# Patient Record
Sex: FEMALE | Race: WHITE | Employment: FULL TIME | URBAN - METROPOLITAN AREA
[De-identification: names, ages, dates, MRNs, and addresses within clinical notes are randomized per-mention and may not be internally consistent; named-entity substitution may affect disease eponyms.]

---

## 2021-07-26 PROBLEM — N18.6 ESRD (END STAGE RENAL DISEASE) (HCC): Status: ACTIVE | Noted: 2021-07-26

## 2021-07-26 PROBLEM — G25.81 RESTLESS LEG SYNDROME: Status: ACTIVE | Noted: 2021-07-26

## 2021-07-26 PROBLEM — I10 HTN (HYPERTENSION): Status: ACTIVE | Noted: 2021-07-26

## 2021-07-26 RX ORDER — SUCROFERRIC OXYHYDROXIDE 500 MG/1
1 TABLET, CHEWABLE ORAL DAILY
Status: ON HOLD | COMMUNITY
End: 2021-09-08

## 2021-07-26 RX ORDER — HYDRALAZINE HYDROCHLORIDE 25 MG/1
50 TABLET, FILM COATED ORAL 3 TIMES DAILY
COMMUNITY

## 2021-07-26 RX ORDER — SPIRONOLACTONE 25 MG/1
25 TABLET ORAL DAILY
COMMUNITY

## 2021-07-26 RX ORDER — VIT B COMP NO.3/FOLIC/C/BIOTIN 1 MG-60 MG
1 TABLET ORAL
COMMUNITY
End: 2021-08-20

## 2021-07-26 RX ORDER — FOLIC ACID/VIT B COMPLEX AND C 0.8 MG
1 TABLET ORAL DAILY
COMMUNITY

## 2021-07-26 RX ORDER — CEPHALEXIN 250 MG/1
250 CAPSULE ORAL 3 TIMES DAILY
Status: ON HOLD | COMMUNITY
End: 2021-09-08

## 2021-07-26 RX ORDER — ACETAMINOPHEN 325 MG/1
650 TABLET ORAL EVERY 4 HOURS PRN
COMMUNITY

## 2021-07-26 RX ORDER — TORSEMIDE 100 MG/1
100 TABLET ORAL DAILY
COMMUNITY

## 2021-07-26 RX ORDER — ROPINIROLE 0.25 MG/1
1 TABLET, FILM COATED ORAL NIGHTLY
COMMUNITY
Start: 2021-07-09

## 2021-07-26 RX ORDER — FERRIC CITRATE 210 MG/1
2 TABLET, COATED ORAL 3 TIMES DAILY
COMMUNITY

## 2021-07-26 RX ORDER — SODIUM BICARBONATE 650 MG/1
650 TABLET ORAL 2 TIMES DAILY
COMMUNITY

## 2021-08-13 ENCOUNTER — TELEPHONE (OUTPATIENT)
Dept: VASCULAR SURGERY | Age: 61
End: 2021-08-13

## 2021-08-13 NOTE — TELEPHONE ENCOUNTER
Called patients Dialysis in regards to more information on her access. ARELI Constantino states she does not know how old it is or who created it. She states she called pt to get the name of Doctor who created it. But was unable to get in contact with her. I also LVM for pt in regards to this. Explained to her that I am trying to get Op Notes and Office visit notes from previous Vascular Surgeon.

## 2021-08-19 NOTE — TELEPHONE ENCOUNTER
Spoke to patient in regards to this. She states that she use to go to Dr. Leonard Deal at [facility] in [address]. Called Dr. Kailey Montoya office to obtain more records in regards to patients access. Unable to get through LVM twice. Will try again in morning.

## 2021-08-19 NOTE — TELEPHONE ENCOUNTER
LVM for pt in regards to getting more information about old Vascular surgeon and what is going on with her current access. Spoke to [employer] at Dialysis. She states whenever they have issues cannulating patient does not let them continue to try cannulating. When they do get the needles in without any issues her pressures are all over the place. RN Valentino Line states she believes there may be some branches in the patients access that could be causing these issues.

## 2021-08-20 ENCOUNTER — INITIAL CONSULT (OUTPATIENT)
Dept: VASCULAR SURGERY | Age: 61
End: 2021-08-20
Payer: COMMERCIAL

## 2021-08-20 VITALS
BODY MASS INDEX: 30.29 KG/M2 | HEIGHT: 67 IN | OXYGEN SATURATION: 97 % | DIASTOLIC BLOOD PRESSURE: 67 MMHG | TEMPERATURE: 97.7 F | WEIGHT: 193 LBS | HEART RATE: 71 BPM | SYSTOLIC BLOOD PRESSURE: 119 MMHG | RESPIRATION RATE: 16 BRPM

## 2021-08-20 DIAGNOSIS — N18.6 ENCOUNTER REGARDING VASCULAR ACCESS FOR DIALYSIS FOR ESRD (HCC): Primary | ICD-10-CM

## 2021-08-20 DIAGNOSIS — T82.590A DIALYSIS AV FISTULA MALFUNCTION, INITIAL ENCOUNTER (HCC): ICD-10-CM

## 2021-08-20 DIAGNOSIS — Z99.2 ENCOUNTER REGARDING VASCULAR ACCESS FOR DIALYSIS FOR ESRD (HCC): Primary | ICD-10-CM

## 2021-08-20 PROCEDURE — 99204 OFFICE O/P NEW MOD 45 MIN: CPT | Performed by: SURGERY

## 2021-08-20 ASSESSMENT — ENCOUNTER SYMPTOMS
SHORTNESS OF BREATH: 0
ROS SKIN COMMENTS: LUE ECCHYMOSIS
SORE THROAT: 0
ABDOMINAL PAIN: 0

## 2021-08-20 NOTE — PROGRESS NOTES
Division of Vascular Surgery        New Consult      Physician Requesting Consult:  Dr Paiz December    Reason for Consult:   LUE dialysis access issue    Chief Complaint:      LUE dialysis access issue    History of Present Illness:      Delta Morfin is a 61 y.o. woman who presents with concerns for access issue. She reports she has renal failure from untreated hypertension. She has been dependent on dialysis for several years. She had a left radiocephalic fistula which did not mature. Now using a LUE brachiocephalic fistula. The patient recently moved to the area from [place] and has been using a new dialysis center. She reports the last few dialysis sessions her fistula has been infiltrating and causing a significant amount of discomfort. She states that she has had one fisutlagram with angioplasty about 4 months ago. She states that two areas needed to be ballooned. She otherwise denies complaints. No fever, chills, CP, SOB. She denies symptoms suggestive of ischemic steal.    Medical History:     Past Medical History:   Diagnosis Date    ESRD (end stage renal disease) ([facility]     HTN (hypertension)     Restless leg syndrome        Surgical History:     No past surgical history on file. Family History:     No family history on file.     Allergies:       Sulfa antibiotics    Medications:      Current Outpatient Medications   Medication Sig Dispense Refill    Magnesium Hydroxide (DULCOLAX PO) Take by mouth daily      rOPINIRole (REQUIP) 0.25 MG tablet Take 1 tablet by mouth daily      Ferric Citrate (AURYXIA) 1  MG(Fe) TABS Take 2 tablets by mouth 3 times daily Take 3 tabs with largest meal of the day      hydrALAZINE (APRESOLINE) 25 MG tablet Take 50 mg by mouth 3 times daily       cephALEXin (KEFLEX) 250 MG capsule Take 250 mg by mouth 3 times daily      B Complex-C-Folic Acid (NEPHRO-SHERIE) 0.8 MG TABS Take 1 tablet by mouth daily      sodium bicarbonate 650 MG tablet Take 650 mg by mouth 2 times daily      spironolactone (ALDACTONE) 25 MG tablet Take 25 mg by mouth daily      torsemide (DEMADEX) 100 MG tablet Take 100 mg by mouth daily      acetaminophen (TYLENOL) 325 MG tablet Take 650 mg by mouth every 4 hours as needed for Pain Take 3 tbs orally every 4 hours for pain PRN      Sucroferric Oxyhydroxide (VELPHORO) 500 MG CHEW Take 1 tablet by mouth daily Take 1 chewable tablet once daily with largest meal (Patient not taking: Reported on 8/20/2021)       No current facility-administered medications for this visit. Social History:     Tobacco:    reports that she quit smoking about 2 years ago. Her smoking use included cigarettes. She quit after 30.00 years of use. She has never used smokeless tobacco.  Alcohol:      has no history on file for alcohol use. Drug Use:  has no history on file for drug use. Review of Systems:     Review of Systems   Constitutional: Negative for fever. HENT: Negative for sore throat. Eyes: Negative for visual disturbance. Respiratory: Negative for shortness of breath. Cardiovascular: Negative for chest pain. Gastrointestinal: Negative for abdominal pain. Endocrine: Negative. Genitourinary: Negative. Musculoskeletal: Negative for joint swelling. LUE discomfort at site of fistula   Skin: Positive for color change. Negative for wound. LUE ecchymosis   Allergic/Immunologic: Negative. Neurological: Negative for dizziness and numbness. Hematological: Negative. Psychiatric/Behavioral: Negative for behavioral problems. Physical Exam:     Vitals:  /67 (Site: Right Upper Arm, Position: Sitting, Cuff Size: Large Adult)   Pulse 71   Temp 97.7 °F (36.5 °C) (Temporal)   Resp 16   Ht 5' 7\" (1.702 m) Comment: per patient  Wt 193 lb (87.5 kg)   SpO2 97%   BMI 30.23 kg/m²     Physical Exam  Constitutional:       Appearance: Normal appearance. She is well-developed and well-groomed.    HENT:      Head: Normocephalic and atraumatic. Eyes:      Extraocular Movements: Extraocular movements intact. Conjunctiva/sclera: Conjunctivae normal.      Pupils: Pupils are equal, round, and reactive to light. Neck:      Vascular: No carotid bruit. Cardiovascular:      Rate and Rhythm: Normal rate and regular rhythm. Pulses: Normal pulses. Radial pulses are 2+ on the right side and 2+ on the left side. Comments: Fistula with good thrill in the upper arm, pulsatile proximally  Pulmonary:      Effort: Pulmonary effort is normal. No respiratory distress. Abdominal:      Palpations: Abdomen is soft. Tenderness: There is no abdominal tenderness. Musculoskeletal:         General: Normal range of motion. Left upper arm: No swelling or tenderness. Left hand: No swelling or tenderness. Normal strength. Normal sensation. Normal capillary refill. Cervical back: Full passive range of motion without pain. Right lower leg: No edema. Left lower leg: No edema. Skin:     General: Skin is warm and dry. Capillary Refill: Capillary refill takes less than 2 seconds. Neurological:      General: No focal deficit present. Mental Status: She is alert and oriented to person, place, and time. GCS: GCS eye subscore is 4. GCS verbal subscore is 5. GCS motor subscore is 6. Sensory: Sensation is intact. Motor: Motor function is intact. Psychiatric:         Mood and Affect: Mood normal.         Speech: Speech normal.         Behavior: Behavior normal.         Thought Content:  Thought content normal.       Imaging/Labs:        Bedside ultrasound reveals patent AV fistula, segmental narrowing over venous access site    Assessment and Plan:        ESRD on hemodialysis, malfunctioning left upper extremity AV fistula  · We discussed performing fistulagram again versus open revision, suspect angioplasty will not work over this segment based on appearance on ultrasound in the office today  · Recommend open revision with possible graft, patch or primary repair based on length of stenosis  · Risks and benefits discussed, questions answered and she consented to proceed  · Recommended that she start aspirin 81 mg daily to help with patency and reduce cardiovascular ischemic events    Electronically signed by Janie Valdez MD on 8/28/2021 at [address] [place] [address],[address]  Office: [phone]  Cell: [phone]  Email: [email or web address]

## 2021-08-28 ASSESSMENT — ENCOUNTER SYMPTOMS
ALLERGIC/IMMUNOLOGIC NEGATIVE: 1
COLOR CHANGE: 1

## 2021-09-08 ENCOUNTER — ANESTHESIA (OUTPATIENT)
Dept: OPERATING ROOM | Age: 61
End: 2021-09-08
Payer: COMMERCIAL

## 2021-09-08 ENCOUNTER — HOSPITAL ENCOUNTER (OUTPATIENT)
Age: 61
Setting detail: OUTPATIENT SURGERY
Discharge: HOME | End: 2021-09-08
Attending: SURGERY | Admitting: SURGERY
Payer: COMMERCIAL

## 2021-09-08 ENCOUNTER — ANESTHESIA EVENT (OUTPATIENT)
Dept: OPERATING ROOM | Age: 61
End: 2021-09-08
Payer: COMMERCIAL

## 2021-09-08 VITALS
OXYGEN SATURATION: 98 % | WEIGHT: 190 LBS | HEIGHT: 67 IN | DIASTOLIC BLOOD PRESSURE: 55 MMHG | TEMPERATURE: 97.9 F | BODY MASS INDEX: 29.82 KG/M2 | SYSTOLIC BLOOD PRESSURE: 130 MMHG | RESPIRATION RATE: 19 BRPM | HEART RATE: 66 BPM

## 2021-09-08 VITALS — OXYGEN SATURATION: 100 % | DIASTOLIC BLOOD PRESSURE: 63 MMHG | SYSTOLIC BLOOD PRESSURE: 132 MMHG | TEMPERATURE: 96.8 F

## 2021-09-08 LAB
GFR NON-AFRICAN AMERICAN: 6 ML/MIN
GFR SERPL CREATININE-BSD FRML MDRD: 8 ML/MIN
GFR SERPL CREATININE-BSD FRML MDRD: ABNORMAL ML/MIN/{1.73_M2}
GLUCOSE BLD-MCNC: 85 MG/DL (ref 74–100)
POC BUN: 35 MG/DL (ref 8–26)
POC CHLORIDE: 96 MMOL/L (ref 98–107)
POC CREATININE: 6.77 MG/DL (ref 0.51–1.19)
POC HEMATOCRIT: 47 % (ref 36–46)
POC HEMOGLOBIN: 15.9 G/DL (ref 12–16)
POC POTASSIUM: 4 MMOL/L (ref 3.5–4.5)
POC SODIUM: 135 MMOL/L (ref 138–146)
SARS-COV-2, RAPID: NOT DETECTED
SPECIMEN DESCRIPTION: NORMAL

## 2021-09-08 PROCEDURE — 87635 SARS-COV-2 COVID-19 AMP PRB: CPT

## 2021-09-08 PROCEDURE — 85014 HEMATOCRIT: CPT

## 2021-09-08 PROCEDURE — 3600000004 HC SURGERY LEVEL 4 BASE: Performed by: SURGERY

## 2021-09-08 PROCEDURE — 6370000000 HC RX 637 (ALT 250 FOR IP): Performed by: SURGERY

## 2021-09-08 PROCEDURE — 3700000001 HC ADD 15 MINUTES (ANESTHESIA): Performed by: SURGERY

## 2021-09-08 PROCEDURE — 84295 ASSAY OF SERUM SODIUM: CPT

## 2021-09-08 PROCEDURE — 82947 ASSAY GLUCOSE BLOOD QUANT: CPT

## 2021-09-08 PROCEDURE — 2580000003 HC RX 258: Performed by: NURSE ANESTHETIST, CERTIFIED REGISTERED

## 2021-09-08 PROCEDURE — 6360000002 HC RX W HCPCS: Performed by: SURGERY

## 2021-09-08 PROCEDURE — 3700000000 HC ANESTHESIA ATTENDED CARE: Performed by: SURGERY

## 2021-09-08 PROCEDURE — 7100000011 HC PHASE II RECOVERY - ADDTL 15 MIN: Performed by: SURGERY

## 2021-09-08 PROCEDURE — 3600000014 HC SURGERY LEVEL 4 ADDTL 15MIN: Performed by: SURGERY

## 2021-09-08 PROCEDURE — 36832 AV FISTULA REVISION OPEN: CPT | Performed by: SURGERY

## 2021-09-08 PROCEDURE — 2500000003 HC RX 250 WO HCPCS: Performed by: SURGERY

## 2021-09-08 PROCEDURE — 84520 ASSAY OF UREA NITROGEN: CPT

## 2021-09-08 PROCEDURE — 2580000003 HC RX 258: Performed by: SURGERY

## 2021-09-08 PROCEDURE — 82435 ASSAY OF BLOOD CHLORIDE: CPT

## 2021-09-08 PROCEDURE — 2709999900 HC NON-CHARGEABLE SUPPLY: Performed by: SURGERY

## 2021-09-08 PROCEDURE — 84132 ASSAY OF SERUM POTASSIUM: CPT

## 2021-09-08 PROCEDURE — C1768 GRAFT, VASCULAR: HCPCS | Performed by: SURGERY

## 2021-09-08 PROCEDURE — 7100000000 HC PACU RECOVERY - FIRST 15 MIN

## 2021-09-08 PROCEDURE — 7100000010 HC PHASE II RECOVERY - FIRST 15 MIN: Performed by: SURGERY

## 2021-09-08 PROCEDURE — 93005 ELECTROCARDIOGRAM TRACING: CPT | Performed by: SURGERY

## 2021-09-08 PROCEDURE — 82565 ASSAY OF CREATININE: CPT

## 2021-09-08 PROCEDURE — 6360000002 HC RX W HCPCS: Performed by: NURSE ANESTHETIST, CERTIFIED REGISTERED

## 2021-09-08 PROCEDURE — 7100000001 HC PACU RECOVERY - ADDTL 15 MIN

## 2021-09-08 DEVICE — GRAFT VASC L15CM ID6MM BOV CAR ART CLLGN FOR FUNC HEMO DYLS: Type: IMPLANTABLE DEVICE | Site: ARM | Status: FUNCTIONAL

## 2021-09-08 RX ORDER — MAGNESIUM HYDROXIDE 1200 MG/15ML
LIQUID ORAL CONTINUOUS PRN
Status: COMPLETED | OUTPATIENT
Start: 2021-09-08 | End: 2021-09-08

## 2021-09-08 RX ORDER — SODIUM CHLORIDE 9 MG/ML
INJECTION, SOLUTION INTRAVENOUS CONTINUOUS PRN
Status: DISCONTINUED | OUTPATIENT
Start: 2021-09-08 | End: 2021-09-08 | Stop reason: SDUPTHER

## 2021-09-08 RX ORDER — FENTANYL CITRATE 50 UG/ML
25 INJECTION, SOLUTION INTRAMUSCULAR; INTRAVENOUS EVERY 5 MIN PRN
Status: DISCONTINUED | OUTPATIENT
Start: 2021-09-08 | End: 2021-09-08 | Stop reason: HOSPADM

## 2021-09-08 RX ORDER — PROPOFOL 10 MG/ML
INJECTION, EMULSION INTRAVENOUS CONTINUOUS PRN
Status: DISCONTINUED | OUTPATIENT
Start: 2021-09-08 | End: 2021-09-08 | Stop reason: SDUPTHER

## 2021-09-08 RX ORDER — FENTANYL CITRATE 50 UG/ML
INJECTION, SOLUTION INTRAMUSCULAR; INTRAVENOUS PRN
Status: DISCONTINUED | OUTPATIENT
Start: 2021-09-08 | End: 2021-09-08 | Stop reason: SDUPTHER

## 2021-09-08 RX ORDER — ONDANSETRON 2 MG/ML
INJECTION INTRAMUSCULAR; INTRAVENOUS PRN
Status: DISCONTINUED | OUTPATIENT
Start: 2021-09-08 | End: 2021-09-08 | Stop reason: SDUPTHER

## 2021-09-08 RX ORDER — LIDOCAINE HYDROCHLORIDE 10 MG/ML
INJECTION, SOLUTION INFILTRATION; PERINEURAL PRN
Status: DISCONTINUED | OUTPATIENT
Start: 2021-09-08 | End: 2021-09-08 | Stop reason: ALTCHOICE

## 2021-09-08 RX ORDER — FENTANYL CITRATE 50 UG/ML
50 INJECTION, SOLUTION INTRAMUSCULAR; INTRAVENOUS EVERY 5 MIN PRN
Status: DISCONTINUED | OUTPATIENT
Start: 2021-09-08 | End: 2021-09-08 | Stop reason: HOSPADM

## 2021-09-08 RX ORDER — SODIUM CHLORIDE 9 MG/ML
INJECTION, SOLUTION INTRAVENOUS CONTINUOUS
Status: DISCONTINUED | OUTPATIENT
Start: 2021-09-08 | End: 2021-09-08 | Stop reason: HOSPADM

## 2021-09-08 RX ORDER — CEFAZOLIN SODIUM 1 G/3ML
INJECTION, POWDER, FOR SOLUTION INTRAMUSCULAR; INTRAVENOUS PRN
Status: DISCONTINUED | OUTPATIENT
Start: 2021-09-08 | End: 2021-09-08 | Stop reason: SDUPTHER

## 2021-09-08 RX ORDER — PROPOFOL 10 MG/ML
INJECTION, EMULSION INTRAVENOUS
Status: COMPLETED
Start: 2021-09-08 | End: 2021-09-08

## 2021-09-08 RX ORDER — HEPARIN SODIUM 1000 [USP'U]/ML
INJECTION, SOLUTION INTRAVENOUS; SUBCUTANEOUS PRN
Status: DISCONTINUED | OUTPATIENT
Start: 2021-09-08 | End: 2021-09-08 | Stop reason: ALTCHOICE

## 2021-09-08 RX ADMIN — SODIUM CHLORIDE: 9 INJECTION, SOLUTION INTRAVENOUS at 11:06

## 2021-09-08 RX ADMIN — SODIUM CHLORIDE: 9 INJECTION, SOLUTION INTRAVENOUS at 11:34

## 2021-09-08 RX ADMIN — PROPOFOL 200 MCG/KG/MIN: 10 INJECTION, EMULSION INTRAVENOUS at 11:38

## 2021-09-08 RX ADMIN — FENTANYL CITRATE 50 MCG: 50 INJECTION, SOLUTION INTRAMUSCULAR; INTRAVENOUS at 11:38

## 2021-09-08 RX ADMIN — FENTANYL CITRATE 25 MCG: 50 INJECTION, SOLUTION INTRAMUSCULAR; INTRAVENOUS at 12:33

## 2021-09-08 RX ADMIN — FENTANYL CITRATE 25 MCG: 50 INJECTION, SOLUTION INTRAMUSCULAR; INTRAVENOUS at 12:12

## 2021-09-08 RX ADMIN — CEFAZOLIN 2000 MG: 1 INJECTION, POWDER, FOR SOLUTION INTRAMUSCULAR; INTRAVENOUS at 11:44

## 2021-09-08 RX ADMIN — ONDANSETRON 4 MG: 2 INJECTION, SOLUTION INTRAMUSCULAR; INTRAVENOUS at 11:49

## 2021-09-08 ASSESSMENT — PULMONARY FUNCTION TESTS
PIF_VALUE: 1
PIF_VALUE: 0
PIF_VALUE: 1

## 2021-09-08 ASSESSMENT — ENCOUNTER SYMPTOMS: SHORTNESS OF BREATH: 0

## 2021-09-08 NOTE — PROGRESS NOTES
Patient admitted, consent signed and questions answered. Patient ready for procedure. Call light to reach with side rails up 2 of 2. Family at bedside with patient. History and physical completed and site marked. Anesthesia in to see.   2% Chlorhexidine cloths used to prep site / left arm

## 2021-09-08 NOTE — ANESTHESIA PRE PROCEDURE
Department of Anesthesiology  Preprocedure Note       Name:  Major Nunez   Age:  61 y.o.  :  1960                                          MRN:  [id number]         Date:  2021      Surgeon: Thania Mak): Janie Valdez MD    Procedure: Procedure(s):  AV FISTULA REVISON UPPER EXTREMITY    Medications prior to admission:   Prior to Admission medications    Medication Sig Start Date End Date Taking? Authorizing Provider   Magnesium Hydroxide (DULCOLAX PO) Take by mouth daily    Historical Provider, MD   rOPINIRole (REQUIP) 0.25 MG tablet Take 1 tablet by mouth daily 21   Historical Provider, MD   Ferric Citrate (AURYXIA) 1  MG(Fe) TABS Take 2 tablets by mouth 3 times daily Take 3 tabs with largest meal of the day    Historical Provider, MD   hydrALAZINE (APRESOLINE) 25 MG tablet Take 50 mg by mouth 3 times daily     Historical Provider, MD   cephALEXin (KEFLEX) 250 MG capsule Take 250 mg by mouth 3 times daily    Historical Provider, MD   B Complex-C-Folic Acid (NEPHRO-SHERIE) 0.8 MG TABS Take 1 tablet by mouth daily    Historical Provider, MD   sodium bicarbonate 650 MG tablet Take 650 mg by mouth 2 times daily    Historical Provider, MD   spironolactone (ALDACTONE) 25 MG tablet Take 25 mg by mouth daily    Historical Provider, MD   torsemide (DEMADEX) 100 MG tablet Take 100 mg by mouth daily    Historical Provider, MD   acetaminophen (TYLENOL) 325 MG tablet Take 650 mg by mouth every 4 hours as needed for Pain Take 3 tbs orally every 4 hours for pain PRN    Historical Provider, MD   Sucroferric Oxyhydroxide (VELPHORO) 500 MG CHEW Take 1 tablet by mouth daily Take 1 chewable tablet once daily with largest meal  Patient not taking: Reported on 2021    Historical Provider, MD       Current medications:    No current facility-administered medications for this encounter. Allergies:     Allergies   Allergen Reactions    Sulfa Antibiotics        Problem List:    Patient Active Problem List   Diagnosis Code    ESRD (end stage renal disease) ([facility] N18.6    Restless leg syndrome G25.81    HTN (hypertension) I10       Past Medical History:        Diagnosis Date    [facility] ([facility] stage [facility] disease) ([facility]     HTN (hypertension)     Restless leg syndrome        Past Surgical History:  No past surgical history on file. Social History:    Social History     Tobacco Use    Smoking status: Former Smoker     Years: 30.00     Types: Cigarettes     Quit date: 2019     Years since quittin.6    Smokeless tobacco: Never Used   Substance Use Topics    Alcohol use: Not on file                                Counseling given: Not Answered      Vital Signs (Current): There were no vitals filed for this visit. BP Readings from Last 3 Encounters:   21 119/67       NPO Status:                                                                                 BMI:   Wt Readings from Last 3 Encounters:   21 193 lb (87.5 kg)     There is no height or weight on file to calculate BMI.    CBC: No results found for: WBC, RBC, HGB, HCT, MCV, RDW, PLT    CMP: No results found for: NA, K, CL, CO2, BUN, CREATININE, GFRAA, AGRATIO, LABGLOM, GLUCOSE, PROT, CALCIUM, BILITOT, ALKPHOS, AST, ALT    POC Tests: No results for input(s): POCGLU, POCNA, POCK, POCCL, POCBUN, POCHEMO, POCHCT in the last 72 hours.     Coags: No results found for: PROTIME, INR, APTT    HCG (If Applicable): No results found for: PREGTESTUR, PREGSERUM, HCG, HCGQUANT     ABGs: No results found for: PHART, PO2ART, PRG4SNI, JYS5PXC, BEART, J5NIXNLG     Type & Screen (If Applicable):  No results found for: LABABO, LABRH    Drug/Infectious Status (If Applicable):  No results found for: HIV, HEPCAB    COVID-19 Screening (If Applicable): No results found for: COVID19        Anesthesia Evaluation  Patient summary reviewed and Nursing notes reviewed no history of anesthetic complications:   Airway: Mallampati: I  TM distance: >3 FB   Neck ROM: full  Mouth opening: > = 3 FB Dental:    (+) lower dentures and upper dentures      Pulmonary:normal exam  breath sounds clear to auscultation      (-) COPD, asthma, shortness of breath, recent URI and sleep apnea                           Cardiovascular:  Exercise tolerance: good (>4 METS),   (+) hypertension:,     (-) past MI, CAD, CABG/stent,  angina,  CHF, orthopnea and  SIFUENTES      Rhythm: regular  Rate: normal                    Neuro/Psych:   Negative Neuro/Psych ROS     (-) seizures, TIA and CVA           GI/Hepatic/Renal:   (+) renal disease: dialysis and ESRD,      (-) GERD       Endo/Other: Negative Endo/Other ROS       (-) diabetes mellitus               Abdominal:             Vascular: negative vascular ROS. Other Findings:           Anesthesia Plan      MAC     ASA 4       Induction: intravenous. Anesthetic plan and risks discussed with patient.       Plan discussed with CRNA and surgical team.                  Caroline Anaya MD   9/8/2021

## 2021-09-08 NOTE — H&P
Division of Vascular Surgery        H&P Update    Patient's Office Note from 8/20/21 was reviewed. There are no changes today. Plan to proceed with revision of left upper extremity AV fistula. Electronically signed by Melisa Bailey MD on 9/8/21 at 10:08 AM EDT      [address] [facility],[address]: [phone]  C: [phone]  Email: [email or web address]    Chief Complaint:       LUE dialysis access issue     History of Present Illness:      German Leahy is a 61 y.o. woman who presents with concerns for access issue. She reports she has renal failure from untreated hypertension. She has been dependent on dialysis for several years. She had a left radiocephalic fistula which did not mature. Now using a LUE brachiocephalic fistula. The patient recently moved to the area from [place] and has been using a new dialysis center. She reports the last few dialysis sessions her fistula has been infiltrating and causing a significant amount of discomfort. She states that she has had one fisutlagram with angioplasty about 4 months ago. She states that two areas needed to be ballooned. She otherwise denies complaints. No fever, chills, CP, SOB.   She denies symptoms suggestive of ischemic steal.     Medical History:      Past Medical History        Past Medical History:   Diagnosis Date    ESRD (end stage renal disease) ([facility]      HTN (hypertension)      Restless leg syndrome              Surgical History:      Past Surgical History   No past surgical history on file.        Family History:      Family History   No family history on file.        Allergies:       Sulfa antibiotics     Medications:      Current Facility-Administered Medications          Current Outpatient Medications   Medication Sig Dispense Refill    Magnesium Hydroxide (DULCOLAX PO) Take by mouth daily        rOPINIRole (REQUIP) 0.25 MG tablet Take 1 tablet by mouth daily        Ferric Citrate (AURYXIA) 1 GM 210 MG(Fe) TABS Take 2 tablets by mouth 3 times daily Take 3 tabs with largest meal of the day        hydrALAZINE (APRESOLINE) 25 MG tablet Take 50 mg by mouth 3 times daily         cephALEXin (KEFLEX) 250 MG capsule Take 250 mg by mouth 3 times daily        B Complex-C-Folic Acid (NEPHRO-SHERIE) 0.8 MG TABS Take 1 tablet by mouth daily        sodium bicarbonate 650 MG tablet Take 650 mg by mouth 2 times daily        spironolactone (ALDACTONE) 25 MG tablet Take 25 mg by mouth daily        torsemide (DEMADEX) 100 MG tablet Take 100 mg by mouth daily        acetaminophen (TYLENOL) 325 MG tablet Take 650 mg by mouth every 4 hours as needed for Pain Take 3 tbs orally every 4 hours for pain PRN        Sucroferric Oxyhydroxide (VELPHORO) 500 MG CHEW Take 1 tablet by mouth daily Take 1 chewable tablet once daily with largest meal (Patient not taking: Reported on 8/20/2021)          No current facility-administered medications for this visit.         Social History:      Tobacco:    reports that she quit smoking about 2 years ago. Her smoking use included cigarettes. She quit after 30.00 years of use. She has never used smokeless tobacco.  Alcohol:      has no history on file for alcohol use. Drug Use:  has no history on file for drug use.     Review of Systems:      Review of Systems   Constitutional: Negative for fever. HENT: Negative for sore throat. Eyes: Negative for visual disturbance. Respiratory: Negative for shortness of breath. Cardiovascular: Negative for chest pain. Gastrointestinal: Negative for abdominal pain. Endocrine: Negative. Genitourinary: Negative. Musculoskeletal: Negative for joint swelling. LUE discomfort at site of fistula   Skin: Positive for color change. Negative for wound. LUE ecchymosis   Allergic/Immunologic: Negative. Neurological: Negative for dizziness and numbness. Hematological: Negative.     Psychiatric/Behavioral: Negative for behavioral problems. Physical Exam:      Vitals:  /67 (Site: Right Upper Arm, Position: Sitting, Cuff Size: Large Adult)   Pulse 71   Temp 97.7 °F (36.5 °C) (Temporal)   Resp 16   Ht 5' 7\" (1.702 m) Comment: per patient  Wt 193 lb (87.5 kg)   SpO2 97%   BMI 30.23 kg/m²      Physical Exam  Constitutional:       Appearance: Normal appearance. She is well-developed and well-groomed. HENT:      Head: Normocephalic and atraumatic. Eyes:      Extraocular Movements: Extraocular movements intact. Conjunctiva/sclera: Conjunctivae normal.      Pupils: Pupils are equal, round, and reactive to light. Neck:      Vascular: No carotid bruit. Cardiovascular:      Rate and Rhythm: Normal rate and regular rhythm. Pulses: Normal pulses. Radial pulses are 2+ on the right side and 2+ on the left side. Comments: Fistula with good thrill in the upper arm, pulsatile proximally  Pulmonary:      Effort: Pulmonary effort is normal. No respiratory distress. Abdominal:      Palpations: Abdomen is soft. Tenderness: There is no abdominal tenderness. Musculoskeletal:         General: Normal range of motion. Left upper arm: No swelling or tenderness. Left hand: No swelling or tenderness. Normal strength. Normal sensation. Normal capillary refill. Cervical back: Full passive range of motion without pain. Right lower leg: No edema. Left lower leg: No edema. Skin:     General: Skin is warm and dry. Capillary Refill: Capillary refill takes less than 2 seconds. Neurological:      General: No focal deficit present. Mental Status: She is alert and oriented to person, place, and time. GCS: GCS eye subscore is 4. GCS verbal subscore is 5. GCS motor subscore is 6. Sensory: Sensation is intact. Motor: Motor function is intact.    Psychiatric:         Mood and Affect: Mood normal.         Speech: Speech normal.         Behavior: Behavior normal. Thought Content: Thought content normal.         Imaging/Labs:         Bedside ultrasound reveals patent AV fistula, segmental narrowing over venous access site     Assessment and Plan:         ESRD on hemodialysis, malfunctioning left upper extremity AV fistula  ? We discussed performing fistulagram again versus open revision, suspect angioplasty will not work over this segment based on appearance on ultrasound in the office today  ? Recommend open revision with possible graft, patch or primary repair based on length of stenosis  ? Risks and benefits discussed, questions answered and she consented to proceed  ?  Recommended that she start aspirin 81 mg daily to help with patency and reduce cardiovascular ischemic events

## 2021-09-08 NOTE — OP NOTE
Operative Note      Patient: Aquilino Arroyo  YOB: 1960  MRN: [id number]    Date of Procedure: 9/8/2021    Pre-Op Diagnosis: END STAGE RENAL DISEASE, malfunctioning AV fistula    Post-Op Diagnosis: Same       Procedure: Open revision of left upper extremity AV fistula, patch angioplasty    Surgeon(s): Cl Pavon MD    Assistant: Dr. Homar Dennison: Monitor Anesthesia Care, local    Estimated Blood Loss (mL): 23 ml    Complications: None    Specimens: none    Implants:  Implant Name Type Inv. Item Serial No.  Lot No. LRB No. Used Action   GRAFT VASC L15CM [id number] BOV CAR ART CLLGN FOR FUNC HEMO DYLS  GRAFT VASC L15CM [id number] BOV CAR ART CLLGN FOR FUNC HEMO DYLS  [employer] [id number] Left 1 Implanted       Drains: none    Findings: Sclerotic segment of cephalic vein fistula. After patch angioplasty improved thrill over fistula. Palpable radial artery pulse on completion. Detailed Description of Procedure:     Mrs. Zacarias Titus was brought to the operating room for open revision of her left upper extremity AV fistula. After appropriate anesthesia delivered, left upper extremity prepped and draped in standard sterile fashion, timeout performed and antibiotics given during this period. I began by evaluating the cephalic vein fistula in the upper arm and marking out the stenotic segment with ultrasound. Local anesthesia delivered and a small longitudinal incision created over the fistula. Electrocautery used to get down to the fistula. Blunt and sharp dissection performed and the cephalic vein fistula dissected out proximally and distally. Clamps placed proximally and distally and venotomy created and extended with kurtz scissors. Artegraft opened and fashioned to make a patch. Using 6-0 proline suture the stenotic portion of the cephalic vein fistula was patch angioplasty. Prior to completion usual flushing maneuvers performed and the anastomosis was hemostatic. There was an excellent thrill now over the fistula and a palpable radial artery pulse. Wound bed irrigated and dried, it was hemostatic. Incision closed in layers of interrupted vicryl and running monocryl. Steristrip and sterile dressings applied, patient tolerated procedure well and was brought to the recovery room.     Electronically signed by Renetta Cueto MD on 9/8/2021 at 1:14 PM

## 2021-09-08 NOTE — PROGRESS NOTES
Ambulates / up to chair without distress. Ice pack to left upper forearm. Site tender to touch. Softly swollen. Bandaid clean and dry. Taking fluids well.

## 2021-09-08 NOTE — PROGRESS NOTES
Received post Left arm fistulogram procedure to [facility] room 8. Assessment obtained. Restrictions reviewed with patient. Post procedure pathway initiated. arm site soft , clean dry and intact. No hematoma noted. Family at side.

## 2021-09-09 ENCOUNTER — TELEPHONE (OUTPATIENT)
Dept: VASCULAR SURGERY | Age: 61
End: 2021-09-09

## 2021-09-09 LAB
EKG ATRIAL RATE: 72 BPM
EKG P AXIS: 58 DEGREES
EKG P-R INTERVAL: 146 MS
EKG Q-T INTERVAL: 406 MS
EKG QRS DURATION: 96 MS
EKG QTC CALCULATION (BAZETT): 444 MS
EKG R AXIS: 24 DEGREES
EKG T AXIS: 86 DEGREES
EKG VENTRICULAR RATE: 72 BPM

## 2021-09-09 NOTE — TELEPHONE ENCOUNTER
[place] [facility] [place] that it is OK to continue to use Miguelina's Fistula, just not where the incision is.   She voiced understanding

## 2021-09-09 NOTE — ANESTHESIA POSTPROCEDURE EVALUATION
Department of Anesthesiology  Postprocedure Note    Patient: Capri Hou  MRN: [id number]  YOB: 1960  Date of evaluation: 9/9/2021  Time:  9:55 AM     Procedure Summary     Date: 09/08/21 Room / Location: [address] [place] [address]    Anesthesia Start: 1134 Anesthesia Stop: 7191    Procedure: LEFT ARM AV FISTULA REVISION WITH [id number] ARTEGRAFT (Left ) Diagnosis: (END STAGE RENAL DISEASE)    Surgeons: Sherwin Halsted, MD Responsible Provider: Carole Aranda MD    Anesthesia Type: MAC ASA Status: 4          Anesthesia Type: MAC    Maureen Phase I: Maureen Score: 10    Maureen Phase II: Maureen Score: 10    Last vitals: Reviewed and per EMR flowsheets.        Anesthesia Post Evaluation    Patient location during evaluation: PACU  Patient participation: complete - patient participated  Level of consciousness: awake and alert  Pain score: 0  Airway patency: patent  Nausea & Vomiting: no nausea and no vomiting  Complications: no  Cardiovascular status: hemodynamically stable  Respiratory status: acceptable, spontaneous ventilation and room air  Hydration status: euvolemic

## 2021-09-09 NOTE — TELEPHONE ENCOUNTER
----- Message from Sarah Marroquin MD sent at 9/8/2021  1:19 PM EDT -----  1 month postop s/p AV fistula revision  No testing  Ok to use fistula except where the incision is

## 2021-09-09 NOTE — TELEPHONE ENCOUNTER
----- Message from Devin Rogers MD sent at 9/8/2021  1:19 PM EDT -----  1 month postop s/p AV fistula revision  No testing  Ok to use fistula except where the incision is

## 2021-10-13 ENCOUNTER — HOSPITAL ENCOUNTER (OUTPATIENT)
Age: 61
Setting detail: SPECIMEN
Discharge: HOME | End: 2021-10-13
Payer: MEDICARE

## 2021-10-13 ENCOUNTER — OFFICE VISIT (OUTPATIENT)
Dept: OBGYN CLINIC | Age: 61
End: 2021-10-13
Payer: COMMERCIAL

## 2021-10-13 VITALS
DIASTOLIC BLOOD PRESSURE: 64 MMHG | SYSTOLIC BLOOD PRESSURE: 113 MMHG | BODY MASS INDEX: 30.07 KG/M2 | WEIGHT: 191.6 LBS | HEART RATE: 74 BPM | HEIGHT: 67 IN

## 2021-10-13 DIAGNOSIS — Z01.419 WOMEN'S ANNUAL ROUTINE GYNECOLOGICAL EXAMINATION: Primary | ICD-10-CM

## 2021-10-13 DIAGNOSIS — Z12.31 ENCOUNTER FOR SCREENING MAMMOGRAM FOR MALIGNANT NEOPLASM OF BREAST: ICD-10-CM

## 2021-10-13 DIAGNOSIS — Z01.419 ENCOUNTER FOR GYNECOLOGICAL EXAMINATION: ICD-10-CM

## 2021-10-13 PROCEDURE — G0101 CA SCREEN;PELVIC/BREAST EXAM: HCPCS | Performed by: ADVANCED PRACTICE MIDWIFE

## 2021-10-13 PROCEDURE — G8484 FLU IMMUNIZE NO ADMIN: HCPCS | Performed by: ADVANCED PRACTICE MIDWIFE

## 2021-10-13 SDOH — ECONOMIC STABILITY: TRANSPORTATION INSECURITY
IN THE PAST 12 MONTHS, HAS LACK OF TRANSPORTATION KEPT YOU FROM MEETINGS, WORK, OR FROM GETTING THINGS NEEDED FOR DAILY LIVING?: NO

## 2021-10-13 SDOH — ECONOMIC STABILITY: TRANSPORTATION INSECURITY
IN THE PAST 12 MONTHS, HAS THE LACK OF TRANSPORTATION KEPT YOU FROM MEDICAL APPOINTMENTS OR FROM GETTING MEDICATIONS?: NO

## 2021-10-13 SDOH — ECONOMIC STABILITY: HOUSING INSECURITY
IN THE LAST 12 MONTHS, WAS THERE A TIME WHEN YOU DID NOT HAVE A STEADY PLACE TO SLEEP OR SLEPT IN A SHELTER (INCLUDING NOW)?: NO

## 2021-10-13 SDOH — ECONOMIC STABILITY: INCOME INSECURITY: IN THE LAST 12 MONTHS, WAS THERE A TIME WHEN YOU WERE NOT ABLE TO PAY THE MORTGAGE OR RENT ON TIME?: NO

## 2021-10-13 SDOH — ECONOMIC STABILITY: FOOD INSECURITY: WITHIN THE PAST 12 MONTHS, YOU WORRIED THAT YOUR FOOD WOULD RUN OUT BEFORE YOU GOT MONEY TO BUY MORE.: NEVER TRUE

## 2021-10-13 SDOH — ECONOMIC STABILITY: FOOD INSECURITY: WITHIN THE PAST 12 MONTHS, THE FOOD YOU BOUGHT JUST DIDN'T LAST AND YOU DIDN'T HAVE MONEY TO GET MORE.: NEVER TRUE

## 2021-10-13 ASSESSMENT — ENCOUNTER SYMPTOMS
GASTROINTESTINAL NEGATIVE: 1
ALLERGIC/IMMUNOLOGIC NEGATIVE: 1
EYES NEGATIVE: 1
RESPIRATORY NEGATIVE: 1

## 2021-10-13 ASSESSMENT — ANXIETY QUESTIONNAIRES
7. FEELING AFRAID AS IF SOMETHING AWFUL MIGHT HAPPEN: 0
GAD7 TOTAL SCORE: 0
5. BEING SO RESTLESS THAT IT IS HARD TO SIT STILL: 0
6. BECOMING EASILY ANNOYED OR IRRITABLE: 0
1. FEELING NERVOUS, ANXIOUS, OR ON EDGE: 0
2. NOT BEING ABLE TO STOP OR CONTROL WORRYING: 0
3. WORRYING TOO MUCH ABOUT DIFFERENT THINGS: 0
4. TROUBLE RELAXING: 0

## 2021-10-13 ASSESSMENT — SOCIAL DETERMINANTS OF HEALTH (SDOH)
WITHIN THE LAST YEAR, HAVE YOU BEEN AFRAID OF YOUR PARTNER OR EX-PARTNER?: NO
WITHIN THE LAST YEAR, HAVE YOU BEEN KICKED, HIT, SLAPPED, OR OTHERWISE PHYSICALLY HURT BY YOUR PARTNER OR EX-PARTNER?: NO
WITHIN THE LAST YEAR, HAVE YOU BEEN HUMILIATED OR EMOTIONALLY ABUSED IN OTHER WAYS BY YOUR PARTNER OR EX-PARTNER?: NO
WITHIN THE LAST YEAR, HAVE TO BEEN RAPED OR FORCED TO HAVE ANY KIND OF SEXUAL ACTIVITY BY YOUR PARTNER OR EX-PARTNER?: NO
HOW HARD IS IT FOR YOU TO PAY FOR THE VERY BASICS LIKE FOOD, HOUSING, MEDICAL CARE, AND HEATING?: NOT HARD AT ALL

## 2021-10-13 ASSESSMENT — PATIENT HEALTH QUESTIONNAIRE - PHQ9
1. LITTLE INTEREST OR PLEASURE IN DOING THINGS: NOT AT ALL
SUM OF ALL RESPONSES TO PHQ9 QUESTIONS 1 & 2: 0
DEPRESSION UNABLE TO ASSESS: YES
2. FEELING DOWN, DEPRESSED OR HOPELESS: NOT AT ALL

## 2021-10-13 NOTE — PROGRESS NOTES
Pt is here today to est. Care. Pt just moved here. Pt would like to inform that she is missing part of her left ovary due to a surgery.

## 2021-10-13 NOTE — PROGRESS NOTES
[facility] AND GYNECOLOGY   [facility] [address]   [address]  [address] [place] [id number]  Dept: [phone]    Patient Name: Lisa Jacob  Patient Age: 64 y.o. Date of Visit: 10/13/2021    Subjective  Chief Complaint   Patient presents with    Gynecologic Exam     last pap 2020/mammogram 2015 hx breast cancer     No LMP recorded. Patient is postmenopausal.    Chaperone for Intimate Exam   Chaperone was offered as part of the rooming process. Patient declined and agrees to continue with exam without a chaperone. HPI  Patient arrives for annual exam.  Patient has concerns today. Concerns include hasn't had pap or mamm in years. Patient reports is  sexually active with 1 male partner(s). Patient denies  pain with sex . Patient denies a history of sexually transmitted infection(s). Patient does not want screening for sexually transmitted infection(s). Reports is not on contraception post menopausal.   Is new to the area [facility]. Lives with [facility]. Has ESRF and is on dialysis awaiting kidney transplant. No flowsheet data found. Interpretation of Total Score Depression Severity: 1-4 = Minimal depression, 5-9 = Mild depression, 10-14 = Moderate depression, 15-19 = Moderately severe depression, 20-27 = Severe depression  CALEB 7 SCORE 10/13/2021   CALEB-7 Total Score 0     Interpretation of CALEB-7 score: 5-9 = mild anxiety, 10-14 = moderate anxiety, 15+ = severe anxiety. Recommend referral to behavioral health for scores 10 or greater. Intimate Partner Violence: Not At Risk    Fear of Current or Ex-Partner: No    Emotionally Abused: No    Physically Abused: No    Sexually Abused: No       Review of Systems   Constitutional: Negative. HENT: Negative. Eyes: Negative. Respiratory: Negative. Cardiovascular: Negative. Gastrointestinal: Negative. Endocrine: Negative. Genitourinary: Negative. Musculoskeletal: Negative.     Skin: Negative. Allergic/Immunologic: Negative. Neurological: Negative. Hematological: Negative. Psychiatric/Behavioral: Negative. Preventive Health Screening:   Date of last pap: 2017? normal            HPV typing/date: unk    History of Abnormal Paps: no  Date of last mammogram: years ago   Date of last DEXA scan: na        Family history of Breast, Ovarian, Colon or Uterine Cancer:  no     If Yes see scanned worksheet    Objective  /64   Pulse 74   Ht 5' 7\" (1.702 m)   Wt 191 lb 9.6 oz (86.9 kg)   BMI 30.01 kg/m²     Physical Exam  Constitutional:       Appearance: She is well-developed. HENT:      Head: Normocephalic and atraumatic. Eyes:      Pupils: Pupils are equal, round, and reactive to light. Neck:      Thyroid: No thyromegaly. Trachea: No tracheal deviation. Cardiovascular:      Rate and Rhythm: Normal rate and regular rhythm. Heart sounds: Normal heart sounds. Pulmonary:      Effort: Pulmonary effort is normal.      Breath sounds: Normal breath sounds. Chest:      Breasts: Breasts are symmetrical.         Right: No mass, nipple discharge, skin change or tenderness. Left: No mass, nipple discharge, skin change or tenderness. Abdominal:      Palpations: Abdomen is soft. Tenderness: There is no abdominal tenderness. Genitourinary:     Exam position: Supine. Labia:         Right: No rash, tenderness, lesion or injury. Left: No rash, tenderness, lesion or injury. Vagina: Normal. No vaginal discharge, erythema, tenderness or bleeding. Cervix: No cervical motion tenderness or discharge. Uterus: Not tender. Adnexa:         Right: No mass or tenderness. Left: No mass or tenderness. Rectum: Normal.      Comments: Has multiple follicular bumps on labia. Not open not draining. She says they have been thre on and off for a long time. Musculoskeletal:         General: Normal range of motion.       Cervical

## 2021-10-18 ENCOUNTER — OFFICE VISIT (OUTPATIENT)
Dept: VASCULAR SURGERY | Age: 61
End: 2021-10-18

## 2021-10-18 VITALS
BODY MASS INDEX: 30.45 KG/M2 | SYSTOLIC BLOOD PRESSURE: 149 MMHG | RESPIRATION RATE: 17 BRPM | HEART RATE: 72 BPM | WEIGHT: 194 LBS | DIASTOLIC BLOOD PRESSURE: 84 MMHG | TEMPERATURE: 98 F | OXYGEN SATURATION: 98 % | HEIGHT: 67 IN

## 2021-10-18 DIAGNOSIS — Z98.890 S/P ARTERIOVENOUS (AV) FISTULA REPAIR: ICD-10-CM

## 2021-10-18 DIAGNOSIS — N18.6 ENCOUNTER REGARDING VASCULAR ACCESS FOR DIALYSIS FOR ESRD (HCC): Primary | ICD-10-CM

## 2021-10-18 DIAGNOSIS — Z86.79 S/P ARTERIOVENOUS (AV) FISTULA REPAIR: ICD-10-CM

## 2021-10-18 DIAGNOSIS — Z99.2 ENCOUNTER REGARDING VASCULAR ACCESS FOR DIALYSIS FOR ESRD (HCC): Primary | ICD-10-CM

## 2021-10-18 PROCEDURE — 99024 POSTOP FOLLOW-UP VISIT: CPT | Performed by: SURGERY

## 2021-10-18 ASSESSMENT — ENCOUNTER SYMPTOMS
SHORTNESS OF BREATH: 0
COLOR CHANGE: 0
CHEST TIGHTNESS: 0

## 2021-10-18 NOTE — PROGRESS NOTES
Division of Vascular Surgery        Postoperative Follow Up    AV fistula revision with patch angioplasty (9/8/21)    History of Present Illness:      Souleymane Acevedo is a 64 y.o. woman presents for postoperative follow up after undergoing open revision of her AV fistula. She has some discomfort over her incision site, but it has been getting better. She denies any prolong bleeding or elevated venous pressures during her dialysis treatments. There has not been any issues with cannulating her access since the surgery. Review of Systems:     Review of Systems   Constitutional: Negative for chills and fever. HENT: Negative for congestion. Eyes: Negative for visual disturbance. Respiratory: Negative for chest tightness and shortness of breath. Cardiovascular: Negative for chest pain and leg swelling. Skin: Negative for color change and wound. Neurological: Negative for weakness and numbness. Physical Exam:     Vitals:  BP (!) 149/84 (Site: Right Lower Arm, Position: Sitting, Cuff Size: Medium Adult)   Pulse 72   Temp 98 °F (36.7 °C) (Temporal)   Resp 17   Ht 5' 7\" (1.702 m) Comment: per pt  Wt 194 lb (88 kg)   SpO2 98%   BMI 30.38 kg/m²     Physical Exam  Cardiovascular:      Rate and Rhythm: Normal rate and regular rhythm. Pulses:           Radial pulses are 2+ on the left side. Pulmonary:      Effort: Pulmonary effort is normal. No respiratory distress. Musculoskeletal:      Left upper arm: No swelling or tenderness. Left hand: No swelling or tenderness. Normal strength. Normal sensation. Normal capillary refill. Right lower leg: No edema. Left lower leg: No edema. Skin:     General: Skin is warm. Capillary Refill: Capillary refill takes less than 2 seconds. Comments: Incision well healed   Neurological:      Mental Status: She is alert. GCS: GCS eye subscore is 4. GCS verbal subscore is 5. GCS motor subscore is 6.       Sensory: Sensation is intact. Motor: Motor function is intact. Imaging/Labs:     Ultrasound performed in the office reveals widely patent cephalic vein fistula and patch angioplasty    Assessment and Plan:     Open revision of AV fistula  · Continue to use AV fistula, avoid scar area for another few months to allow full healing of patch before cannulating  · She will follow up as needed regarding her access    Electronically signed by Jodi Dillard MD on 10/18/21 at 11:30 AM EDT      [address] [place] [address],[address]: [phone]  C: [phone]  Email: [email or web address]

## 2021-10-21 LAB — CYTOLOGY REPORT: NORMAL

## 2021-10-26 ENCOUNTER — HOSPITAL ENCOUNTER (OUTPATIENT)
Dept: MAMMOGRAPHY | Age: 61
Discharge: HOME | End: 2021-10-28
Payer: COMMERCIAL

## 2021-10-26 DIAGNOSIS — Z12.31 ENCOUNTER FOR SCREENING MAMMOGRAM FOR MALIGNANT NEOPLASM OF BREAST: ICD-10-CM

## 2021-10-26 PROCEDURE — 77067 SCR MAMMO BI INCL CAD: CPT

## (undated) DEVICE — SUTURE NONABSORBABLE MONOFILAMENT 7-0 BV-1 1X24 IN PROLENE 8702H

## (undated) DEVICE — STRIP,CLOSURE,WOUND,MEDI-STRIP,1/2X4: Brand: MEDLINE

## (undated) DEVICE — APPLICATOR MEDICATED 26 CC SOLUTION HI LT ORNG CHLORAPREP

## (undated) DEVICE — SUTURE VCRL + SZ 4-0 L27IN ABSRB UD L26MM SH 1/2 CIR VCP415H

## (undated) DEVICE — SUTURE PERMAHAND SZ 4-0 L18IN NONABSORBABLE BLK SILK BRAID A183H

## (undated) DEVICE — PROVE COVER: Brand: UNBRANDED

## (undated) DEVICE — GLOVE SURG SZ 6 CRM LTX FREE POLYISOPRENE POLYMER BEAD ANTI

## (undated) DEVICE — CONTAINER SPEC 33OZ URIN COLL BIODEGRADABLE PAPER DISP

## (undated) DEVICE — 3M™ STERI-STRIP™ COMPOUND BENZOIN TINCTURE 40 BAGS/CARTON 4 CARTONS/CASE C1544: Brand: 3M™ STERI-STRIP™

## (undated) DEVICE — GLOVE SURG SZ 8 CRM LTX FREE POLYISOPRENE POLYMER BEAD ANTI

## (undated) DEVICE — GLOVE SURG SZ 7 CRM LTX FREE POLYISOPRENE POLYMER BEAD ANTI

## (undated) DEVICE — DRESSING TRNSPAR W2XL2.75IN FLM SHT SEMIPERMEABLE WIND

## (undated) DEVICE — GLOVE SURG SZ 75 L12IN FNGR THK79MIL GRN LTX FREE

## (undated) DEVICE — SUTURE MCRYL SZ 5-0 L18IN ABSRB UD PC-3 L16MM 3/8 CIR Y844G

## (undated) DEVICE — INTENDED FOR TISSUE SEPARATION, AND OTHER PROCEDURES THAT REQUIRE A SHARP SURGICAL BLADE TO PUNCTURE OR CUT.: Brand: BARD-PARKER ® CARBON RIB-BACK BLADES

## (undated) DEVICE — BLADE ES ELASTOMERIC COAT INSUL DURABLE BEND UPTO 90DEG

## (undated) DEVICE — IV START KIT: Brand: MEDLINE

## (undated) DEVICE — GLOVE SURG SZ 65 L12IN FNGR THK79MIL GRN LTX FREE

## (undated) DEVICE — Device

## (undated) DEVICE — SUTURE PROL SZ 6-0 L24IN NONABSORBABLE BLU L9.3MM BV-1 3/8 8805H

## (undated) DEVICE — GLOVE SURG SZ 65 CRM LTX FREE POLYISOPRENE POLYMER BEAD ANTI

## (undated) DEVICE — COVER,LIGHT HANDLE,FLX,2/PK: Brand: MEDLINE INDUSTRIES, INC.

## (undated) DEVICE — CATHETER ETER IV 18GA L125IN POLYUR STR RADPQ INTROCAN SFTY

## (undated) DEVICE — SUTURE MCRYL SZ 5-0 L18IN ABSRB VLT P-3 L13MM 3/8 CIR REV Y463G

## (undated) DEVICE — SPONGE GZ W2XL2IN NONWOVEN 4 PLY FASTER WICKING ABIL AVANT

## (undated) DEVICE — GOWN,AURORA,NONREINFORCED,LARGE: Brand: MEDLINE

## (undated) DEVICE — CATHETER IV 18GA L1.16IN OD1.27-1.3462MM ID0.9398-1.016MM

## (undated) DEVICE — CONTAINER,SPECIMEN,4OZ,OR STRL: Brand: MEDLINE